# Patient Record
Sex: FEMALE | Race: WHITE | NOT HISPANIC OR LATINO | URBAN - METROPOLITAN AREA
[De-identification: names, ages, dates, MRNs, and addresses within clinical notes are randomized per-mention and may not be internally consistent; named-entity substitution may affect disease eponyms.]

---

## 2017-01-13 ENCOUNTER — EMERGENCY (EMERGENCY)
Facility: HOSPITAL | Age: 62
LOS: 1 days | Discharge: HOME | End: 2017-01-13
Attending: EMERGENCY MEDICINE | Admitting: EMERGENCY MEDICINE
Payer: COMMERCIAL

## 2017-01-13 VITALS
HEART RATE: 86 BPM | SYSTOLIC BLOOD PRESSURE: 126 MMHG | OXYGEN SATURATION: 99 % | DIASTOLIC BLOOD PRESSURE: 74 MMHG | RESPIRATION RATE: 18 BRPM | TEMPERATURE: 97 F

## 2017-01-13 DIAGNOSIS — Z88.0 ALLERGY STATUS TO PENICILLIN: ICD-10-CM

## 2017-01-13 DIAGNOSIS — Z79.899 OTHER LONG TERM (CURRENT) DRUG THERAPY: ICD-10-CM

## 2017-01-13 DIAGNOSIS — J20.9 ACUTE BRONCHITIS, UNSPECIFIED: ICD-10-CM

## 2017-01-13 DIAGNOSIS — R05 COUGH: ICD-10-CM

## 2017-01-13 PROCEDURE — 71020: CPT | Mod: 26

## 2017-01-13 PROCEDURE — 99284 EMERGENCY DEPT VISIT MOD MDM: CPT

## 2017-01-13 RX ORDER — ALBUTEROL 90 UG/1
2 AEROSOL, METERED ORAL
Qty: 1 | Refills: 0 | OUTPATIENT
Start: 2017-01-13 | End: 2017-01-17

## 2017-01-13 NOTE — ED PROVIDER NOTE - NS ED MD SCRIBE ATTENDING SCRIBE SECTIONS
REVIEW OF SYSTEMS/DISPOSITION/PAST MEDICAL/SURGICAL/SOCIAL HISTORY/HISTORY OF PRESENT ILLNESS/PHYSICAL EXAM/OBSERVATION MONITORING PLAN

## 2017-01-13 NOTE — ED PROVIDER NOTE - OBJECTIVE STATEMENT
60 yo F w/ hx of HTN c/o cough and cold sx of runny nose/congestion x1 week. Pt states she feels like she's getting sicker instead of better. Pt also describes pain to the back of her head, which she attributes to coughing so much. Denies CP, blood in phlegm, belly pain, n/v. Pt notes she had an colonoscopy on Monday. Has been taking Theraflu for cold sx, with little relief. Pt admits to getting flu vaccine this year.

## 2017-06-05 ENCOUNTER — EMERGENCY (EMERGENCY)
Facility: HOSPITAL | Age: 62
LOS: 1 days | Discharge: HOME | End: 2017-06-05
Attending: EMERGENCY MEDICINE | Admitting: EMERGENCY MEDICINE
Payer: COMMERCIAL

## 2017-06-05 VITALS
HEART RATE: 76 BPM | OXYGEN SATURATION: 98 % | RESPIRATION RATE: 16 BRPM | SYSTOLIC BLOOD PRESSURE: 152 MMHG | DIASTOLIC BLOOD PRESSURE: 84 MMHG | TEMPERATURE: 98 F

## 2017-06-05 VITALS — WEIGHT: 160.06 LBS | HEIGHT: 66 IN

## 2017-06-05 PROBLEM — I10 ESSENTIAL (PRIMARY) HYPERTENSION: Chronic | Status: ACTIVE | Noted: 2017-01-13

## 2017-06-05 PROCEDURE — 99284 EMERGENCY DEPT VISIT MOD MDM: CPT

## 2017-06-05 RX ORDER — DIAZEPAM 5 MG
5 TABLET ORAL ONCE
Qty: 0 | Refills: 0 | Status: DISCONTINUED | OUTPATIENT
Start: 2017-06-05 | End: 2017-06-05

## 2017-06-05 RX ORDER — KETOROLAC TROMETHAMINE 30 MG/ML
60 SYRINGE (ML) INJECTION ONCE
Qty: 0 | Refills: 0 | Status: DISCONTINUED | OUTPATIENT
Start: 2017-06-05 | End: 2017-06-05

## 2017-06-05 RX ORDER — IBUPROFEN 200 MG
1 TABLET ORAL
Qty: 30 | Refills: 0 | OUTPATIENT
Start: 2017-06-05 | End: 2017-06-12

## 2017-06-05 RX ORDER — DIAZEPAM 5 MG
1 TABLET ORAL
Qty: 9 | Refills: 0 | OUTPATIENT
Start: 2017-06-05 | End: 2017-06-08

## 2017-06-05 RX ADMIN — Medication 5 MILLIGRAM(S): at 13:18

## 2017-06-05 RX ADMIN — Medication 60 MILLIGRAM(S): at 13:18

## 2017-06-05 RX ADMIN — Medication 60 MILLIGRAM(S): at 13:38

## 2017-06-05 NOTE — ED PROVIDER NOTE - MEDICAL DECISION MAKING DETAILS
atraumatic pain with bending over, no acute surgical symptoms on exam. advised pain control and pmd/spine f/u for out pt imaging.

## 2017-06-05 NOTE — ED ADULT NURSE NOTE - CHPI ED SYMPTOMS NEG
no anorexia/no difficulty bearing weight/no tingling/no fatigue/no bowel dysfunction/no bladder dysfunction/no constipation/no neck tenderness/no numbness/no motor function loss

## 2017-06-05 NOTE — ED PROVIDER NOTE - CHPI ED SYMPTOMS NEG
no motor function loss/no anorexia/no bowel dysfunction/no tingling/no constipation/no fatigue/no bladder dysfunction/no neck tenderness/no numbness/no difficulty bearing weight

## 2017-06-05 NOTE — ED PROVIDER NOTE - NEUROLOGICAL, MLM
Alert and oriented, no focal deficits, no motor or sensory deficits. Neg SLR bilateral, stable gait, strength 5/5 to bilateral lower extremities

## 2017-06-05 NOTE — ED ADULT TRIAGE NOTE - CHIEF COMPLAINT QUOTE
Pt accidentally pulled her back out on Sunday while bending over to pick something up. OTC medications are not helping.

## 2017-06-05 NOTE — ED ADULT NURSE NOTE - OBJECTIVE STATEMENT
pt. aaox3, states she hurt her back saturday from bending over. pt. has slight relief with OTC medication (tylenol)

## 2017-06-09 DIAGNOSIS — I10 ESSENTIAL (PRIMARY) HYPERTENSION: ICD-10-CM

## 2017-06-09 DIAGNOSIS — Z79.899 OTHER LONG TERM (CURRENT) DRUG THERAPY: ICD-10-CM

## 2017-06-09 DIAGNOSIS — M54.5 LOW BACK PAIN: ICD-10-CM

## 2017-06-09 DIAGNOSIS — Z88.0 ALLERGY STATUS TO PENICILLIN: ICD-10-CM

## 2017-06-09 DIAGNOSIS — Z79.1 LONG TERM (CURRENT) USE OF NON-STEROIDAL ANTI-INFLAMMATORIES (NSAID): ICD-10-CM

## 2017-08-01 ENCOUNTER — EMERGENCY (EMERGENCY)
Facility: HOSPITAL | Age: 62
LOS: 1 days | Discharge: HOME | End: 2017-08-01
Attending: EMERGENCY MEDICINE | Admitting: EMERGENCY MEDICINE
Payer: COMMERCIAL

## 2017-08-01 VITALS
SYSTOLIC BLOOD PRESSURE: 144 MMHG | RESPIRATION RATE: 18 BRPM | DIASTOLIC BLOOD PRESSURE: 84 MMHG | HEART RATE: 70 BPM | OXYGEN SATURATION: 96 % | TEMPERATURE: 98 F

## 2017-08-01 DIAGNOSIS — I10 ESSENTIAL (PRIMARY) HYPERTENSION: ICD-10-CM

## 2017-08-01 DIAGNOSIS — Z79.899 OTHER LONG TERM (CURRENT) DRUG THERAPY: ICD-10-CM

## 2017-08-01 DIAGNOSIS — G89.29 OTHER CHRONIC PAIN: ICD-10-CM

## 2017-08-01 DIAGNOSIS — Z79.1 LONG TERM (CURRENT) USE OF NON-STEROIDAL ANTI-INFLAMMATORIES (NSAID): ICD-10-CM

## 2017-08-01 DIAGNOSIS — Z88.0 ALLERGY STATUS TO PENICILLIN: ICD-10-CM

## 2017-08-01 DIAGNOSIS — M54.5 LOW BACK PAIN: ICD-10-CM

## 2017-08-01 DIAGNOSIS — Z79.891 LONG TERM (CURRENT) USE OF OPIATE ANALGESIC: ICD-10-CM

## 2017-08-01 PROCEDURE — 72100 X-RAY EXAM L-S SPINE 2/3 VWS: CPT | Mod: 26

## 2017-08-01 PROCEDURE — 99284 EMERGENCY DEPT VISIT MOD MDM: CPT | Mod: 25

## 2017-08-01 RX ORDER — DIAZEPAM 5 MG
1 TABLET ORAL
Qty: 8 | Refills: 0 | OUTPATIENT
Start: 2017-08-01 | End: 2017-08-05

## 2017-08-01 RX ORDER — KETOROLAC TROMETHAMINE 30 MG/ML
30 SYRINGE (ML) INJECTION ONCE
Qty: 0 | Refills: 0 | Status: DISCONTINUED | OUTPATIENT
Start: 2017-08-01 | End: 2017-08-01

## 2017-08-01 RX ADMIN — Medication 30 MILLIGRAM(S): at 18:00

## 2017-08-01 NOTE — ED PROVIDER NOTE - MEDICAL DECISION MAKING DETAILS
Pt presents with chronic generalized low back pain x2 months. Will give Valium and Toradol. X-ray ordered. Re-evaluate. Instructed to follow up with orthopedist and PMD.

## 2017-08-01 NOTE — ED PROVIDER NOTE - OBJECTIVE STATEMENT
63 yo F with a hx of HTN, currently on Losartan presents with chronic generalized low back pain [id number]. No exertion or trauma recently. No change in bladder/bowel dysfunction. Notes that she was here last time for back pain and had relieve with Toradol. Denies any chance of pregnancy. Has orthopedist follow up for August 28th. PMD: Dr. Lynch

## 2018-01-10 ENCOUNTER — EMERGENCY (EMERGENCY)
Facility: HOSPITAL | Age: 63
LOS: 1 days | Discharge: HOME | End: 2018-01-10
Admitting: EMERGENCY MEDICINE
Payer: COMMERCIAL

## 2018-01-10 VITALS
SYSTOLIC BLOOD PRESSURE: 152 MMHG | OXYGEN SATURATION: 96 % | HEART RATE: 65 BPM | RESPIRATION RATE: 16 BRPM | DIASTOLIC BLOOD PRESSURE: 87 MMHG | TEMPERATURE: 97 F

## 2018-01-10 DIAGNOSIS — Z88.0 ALLERGY STATUS TO PENICILLIN: ICD-10-CM

## 2018-01-10 DIAGNOSIS — Z79.1 LONG TERM (CURRENT) USE OF NON-STEROIDAL ANTI-INFLAMMATORIES (NSAID): ICD-10-CM

## 2018-01-10 DIAGNOSIS — Z79.899 OTHER LONG TERM (CURRENT) DRUG THERAPY: ICD-10-CM

## 2018-01-10 DIAGNOSIS — M54.5 LOW BACK PAIN: ICD-10-CM

## 2018-01-10 DIAGNOSIS — I10 ESSENTIAL (PRIMARY) HYPERTENSION: ICD-10-CM

## 2018-01-10 PROCEDURE — 99284 EMERGENCY DEPT VISIT MOD MDM: CPT | Mod: 25

## 2018-01-10 PROCEDURE — 71046 X-RAY EXAM CHEST 2 VIEWS: CPT | Mod: 26

## 2018-01-10 PROCEDURE — 72100 X-RAY EXAM L-S SPINE 2/3 VWS: CPT | Mod: 26

## 2018-01-10 RX ORDER — LOSARTAN POTASSIUM 100 MG/1
1 TABLET, FILM COATED ORAL
Qty: 0 | Refills: 0 | COMMUNITY

## 2018-01-10 RX ORDER — DIAZEPAM 5 MG
5 TABLET ORAL ONCE
Qty: 0 | Refills: 0 | Status: DISCONTINUED | OUTPATIENT
Start: 2018-01-10 | End: 2018-01-10

## 2018-01-10 RX ORDER — DIAZEPAM 5 MG
1 TABLET ORAL
Qty: 9 | Refills: 0 | OUTPATIENT
Start: 2018-01-10 | End: 2018-01-12

## 2018-01-10 RX ORDER — KETOROLAC TROMETHAMINE 30 MG/ML
60 SYRINGE (ML) INJECTION ONCE
Qty: 0 | Refills: 0 | Status: DISCONTINUED | OUTPATIENT
Start: 2018-01-10 | End: 2018-01-10

## 2018-01-10 RX ADMIN — Medication 60 MILLIGRAM(S): at 11:14

## 2018-01-10 RX ADMIN — Medication 5 MILLIGRAM(S): at 11:14

## 2018-01-10 NOTE — ED PROVIDER NOTE - MEDICAL DECISION MAKING DETAILS
61 y/o F presents to ED c/o recurrent lumbar back pain.  Pt treated effectively with Toradol injection and Valium Rx as described.  No red flags, pt ambulatory, full weight bearing, independently.  Pt prescribed Naprosyn and Valium, advised to f/u with PCP for MRI.

## 2018-01-10 NOTE — ED PROVIDER NOTE - OBJECTIVE STATEMENT
61 yo F with a hx of HTN, sciatica, allergy to penicillin presents with chronic generalized low back pain that occurs approximately 3x/year. Pt states pain is usually triggered by bending over and resolves in 2-3 days. Yesterday she was getting up from her chair when she suddenly felt back pain. She took Tylenol yesterday, ibuprofen today, and has been applying menthol pads, with no relief. Pt notes she has had sciatica in her right leg, but has not felt it No numbness or tingling in BUE or BLE, no bladder/bowel dysfunction, no exertion or trauma recently. 63 yo F with a hx of HTN, sciatica, allergy to penicillin presents with chronic generalized low back pain that occurs approximately 3x/year. Pt states pain is usually triggered by bending over and resolves in 2-3 days. Yesterday she was getting up from her chair when she suddenly felt back pain. She took Tylenol yesterday, ibuprofen today, and has been applying menthol pads, with no relief. Pt notes she has had sciatica in her right leg, but has not felt it since it symptoms began. Also notes she usually gets a "shot" that helps with pain. Denies abdominal pain, numbness or tingling in BUE or BLE, bladder/bowel dysfunction. Denies recent exertion or trauma. Denies tobacco use. 63 yo F with a hx of HTN, sciatica, allergy to penicillin presents with chronic generalized low back pain that occurs approximately 3x/year. Pt states pain is usually triggered by bending over and resolves in 2-3 days. Yesterday she was getting up from her chair when she suddenly felt back pain. She took Tylenol yesterday, ibuprofen today, and has been applying menthol pads, with no relief. Pt notes she has had sciatica in her right leg, but has not felt it since it symptoms began. Also notes she usually gets a "shot" that helps with pain. Denies fevers, abdominal pain, IVDA, numbness or tingling in BUE or BLE, bladder/bowel dysfunction, weakness. Denies recent exertion or trauma. Denies tobacco use.

## 2018-01-10 NOTE — ED ADULT NURSE NOTE - ADDITIONAL PRINTED INSTRUCTIONS GIVEN
D/c w/ instructions for f/u, pt to f/u w/ pmd, return to ER for worsening symptoms. Verbalized understanding

## 2018-05-17 ENCOUNTER — EMERGENCY (EMERGENCY)
Facility: HOSPITAL | Age: 63
LOS: 1 days | Discharge: HOME | End: 2018-05-17
Attending: EMERGENCY MEDICINE | Admitting: EMERGENCY MEDICINE
Payer: COMMERCIAL

## 2018-05-17 VITALS
TEMPERATURE: 98 F | SYSTOLIC BLOOD PRESSURE: 122 MMHG | DIASTOLIC BLOOD PRESSURE: 77 MMHG | RESPIRATION RATE: 15 BRPM | OXYGEN SATURATION: 97 % | HEART RATE: 77 BPM

## 2018-05-17 VITALS
HEART RATE: 86 BPM | WEIGHT: 154.1 LBS | RESPIRATION RATE: 18 BRPM | OXYGEN SATURATION: 96 % | DIASTOLIC BLOOD PRESSURE: 68 MMHG | SYSTOLIC BLOOD PRESSURE: 109 MMHG | TEMPERATURE: 101 F

## 2018-05-17 DIAGNOSIS — Z88.0 ALLERGY STATUS TO PENICILLIN: ICD-10-CM

## 2018-05-17 DIAGNOSIS — E87.6 HYPOKALEMIA: ICD-10-CM

## 2018-05-17 DIAGNOSIS — N39.0 URINARY TRACT INFECTION, SITE NOT SPECIFIED: ICD-10-CM

## 2018-05-17 DIAGNOSIS — Z79.1 LONG TERM (CURRENT) USE OF NON-STEROIDAL ANTI-INFLAMMATORIES (NSAID): ICD-10-CM

## 2018-05-17 DIAGNOSIS — Z79.899 OTHER LONG TERM (CURRENT) DRUG THERAPY: ICD-10-CM

## 2018-05-17 DIAGNOSIS — I10 ESSENTIAL (PRIMARY) HYPERTENSION: ICD-10-CM

## 2018-05-17 DIAGNOSIS — Z79.51 LONG TERM (CURRENT) USE OF INHALED STEROIDS: ICD-10-CM

## 2018-05-17 DIAGNOSIS — R52 PAIN, UNSPECIFIED: ICD-10-CM

## 2018-05-17 LAB
ALBUMIN SERPL ELPH-MCNC: 3.7 G/DL — SIGNIFICANT CHANGE UP (ref 3.4–5)
ALP SERPL-CCNC: 57 U/L — SIGNIFICANT CHANGE UP (ref 40–120)
ALT FLD-CCNC: 31 U/L — SIGNIFICANT CHANGE UP (ref 12–42)
ANION GAP SERPL CALC-SCNC: 6 MMOL/L — LOW (ref 9–16)
APPEARANCE UR: CLEAR — SIGNIFICANT CHANGE UP
APTT BLD: 31 SEC — SIGNIFICANT CHANGE UP (ref 27.5–36.5)
AST SERPL-CCNC: 21 U/L — SIGNIFICANT CHANGE UP (ref 15–37)
BASOPHILS NFR BLD AUTO: 0.3 % — SIGNIFICANT CHANGE UP (ref 0–2)
BILIRUB SERPL-MCNC: 0.6 MG/DL — SIGNIFICANT CHANGE UP (ref 0.2–1.2)
BILIRUB UR-MCNC: NEGATIVE — SIGNIFICANT CHANGE UP
BUN SERPL-MCNC: 16 MG/DL — SIGNIFICANT CHANGE UP (ref 7–23)
CALCIUM SERPL-MCNC: 9.2 MG/DL — SIGNIFICANT CHANGE UP (ref 8.5–10.5)
CHLORIDE SERPL-SCNC: 94 MMOL/L — LOW (ref 96–108)
CO2 SERPL-SCNC: 31 MMOL/L — SIGNIFICANT CHANGE UP (ref 22–31)
COLOR SPEC: YELLOW — SIGNIFICANT CHANGE UP
CREAT SERPL-MCNC: 0.92 MG/DL — SIGNIFICANT CHANGE UP (ref 0.5–1.3)
DIFF PNL FLD: NEGATIVE — SIGNIFICANT CHANGE UP
EOSINOPHIL NFR BLD AUTO: 0.1 % — SIGNIFICANT CHANGE UP (ref 0–6)
FLUAV SPEC QL CULT: NEGATIVE — SIGNIFICANT CHANGE UP
FLUBV AG SPEC QL IA: NEGATIVE — SIGNIFICANT CHANGE UP
GLUCOSE SERPL-MCNC: 107 MG/DL — HIGH (ref 70–99)
GLUCOSE UR QL: NEGATIVE — SIGNIFICANT CHANGE UP
HCT VFR BLD CALC: 39.1 % — SIGNIFICANT CHANGE UP (ref 34.5–45)
HGB BLD-MCNC: 13.5 G/DL — SIGNIFICANT CHANGE UP (ref 11.5–15.5)
IMM GRANULOCYTES NFR BLD AUTO: 0.6 % — SIGNIFICANT CHANGE UP (ref 0–1.5)
INR BLD: 1.05 — SIGNIFICANT CHANGE UP (ref 0.88–1.16)
KETONES UR-MCNC: (no result) MG/DL
LACTATE SERPL-SCNC: 0.8 MMOL/L — SIGNIFICANT CHANGE UP (ref 0.4–2)
LEUKOCYTE ESTERASE UR-ACNC: (no result)
LIDOCAIN IGE QN: 118 U/L — SIGNIFICANT CHANGE UP (ref 73–393)
LYMPHOCYTES # BLD AUTO: 6 % — LOW (ref 13–44)
MAGNESIUM SERPL-MCNC: 1.7 MG/DL — SIGNIFICANT CHANGE UP (ref 1.6–2.6)
MAGNESIUM SERPL-MCNC: 1.7 MG/DL — SIGNIFICANT CHANGE UP (ref 1.6–2.6)
MCHC RBC-ENTMCNC: 28.8 PG — SIGNIFICANT CHANGE UP (ref 27–34)
MCHC RBC-ENTMCNC: 34.5 G/DL — SIGNIFICANT CHANGE UP (ref 32–36)
MCV RBC AUTO: 83.4 FL — SIGNIFICANT CHANGE UP (ref 80–100)
MONOCYTES NFR BLD AUTO: 6.2 % — SIGNIFICANT CHANGE UP (ref 2–14)
NEUTROPHILS NFR BLD AUTO: 86.8 % — HIGH (ref 43–77)
NITRITE UR-MCNC: NEGATIVE — SIGNIFICANT CHANGE UP
PH UR: 7 — SIGNIFICANT CHANGE UP (ref 5–8)
PLATELET # BLD AUTO: 260 K/UL — SIGNIFICANT CHANGE UP (ref 150–400)
POTASSIUM SERPL-MCNC: 2.7 MMOL/L — CRITICAL LOW (ref 3.5–5.3)
POTASSIUM SERPL-SCNC: 2.7 MMOL/L — CRITICAL LOW (ref 3.5–5.3)
PROT SERPL-MCNC: 7.3 G/DL — SIGNIFICANT CHANGE UP (ref 6.4–8.2)
PROT UR-MCNC: NEGATIVE MG/DL — SIGNIFICANT CHANGE UP
PROTHROM AB SERPL-ACNC: 11.6 SEC — SIGNIFICANT CHANGE UP (ref 9.8–12.7)
RBC # BLD: 4.69 M/UL — SIGNIFICANT CHANGE UP (ref 3.8–5.2)
RBC # FLD: 12.6 % — SIGNIFICANT CHANGE UP (ref 10.3–16.9)
SODIUM SERPL-SCNC: 131 MMOL/L — LOW (ref 132–145)
SP GR SPEC: 1.02 — SIGNIFICANT CHANGE UP (ref 1–1.03)
UROBILINOGEN FLD QL: 0.2 E.U./DL — SIGNIFICANT CHANGE UP
WBC # BLD: 10.4 K/UL — SIGNIFICANT CHANGE UP (ref 3.8–10.5)
WBC # FLD AUTO: 10.4 K/UL — SIGNIFICANT CHANGE UP (ref 3.8–10.5)

## 2018-05-17 PROCEDURE — 71046 X-RAY EXAM CHEST 2 VIEWS: CPT | Mod: 26

## 2018-05-17 PROCEDURE — 99220: CPT

## 2018-05-17 RX ORDER — KETOROLAC TROMETHAMINE 30 MG/ML
30 SYRINGE (ML) INJECTION ONCE
Qty: 0 | Refills: 0 | Status: DISCONTINUED | OUTPATIENT
Start: 2018-05-17 | End: 2018-05-17

## 2018-05-17 RX ORDER — SODIUM CHLORIDE 9 MG/ML
1000 INJECTION INTRAMUSCULAR; INTRAVENOUS; SUBCUTANEOUS ONCE
Qty: 0 | Refills: 0 | Status: COMPLETED | OUTPATIENT
Start: 2018-05-17 | End: 2018-05-17

## 2018-05-17 RX ORDER — AZITHROMYCIN 500 MG/1
500 TABLET, FILM COATED ORAL ONCE
Qty: 0 | Refills: 0 | Status: COMPLETED | OUTPATIENT
Start: 2018-05-17 | End: 2018-05-17

## 2018-05-17 RX ORDER — ONDANSETRON 8 MG/1
4 TABLET, FILM COATED ORAL ONCE
Qty: 0 | Refills: 0 | Status: COMPLETED | OUTPATIENT
Start: 2018-05-17 | End: 2018-05-17

## 2018-05-17 RX ORDER — SODIUM CHLORIDE 9 MG/ML
2000 INJECTION INTRAMUSCULAR; INTRAVENOUS; SUBCUTANEOUS ONCE
Qty: 0 | Refills: 0 | Status: COMPLETED | OUTPATIENT
Start: 2018-05-17 | End: 2018-05-17

## 2018-05-17 RX ORDER — SACCHAROMYCES BOULARDII 250 MG
1 POWDER IN PACKET (EA) ORAL
Qty: 60 | Refills: 0 | OUTPATIENT
Start: 2018-05-17 | End: 2018-06-15

## 2018-05-17 RX ORDER — ACETAMINOPHEN 500 MG
650 TABLET ORAL ONCE
Qty: 0 | Refills: 0 | Status: COMPLETED | OUTPATIENT
Start: 2018-05-17 | End: 2018-05-17

## 2018-05-17 RX ORDER — CEFUROXIME AXETIL 250 MG
1 TABLET ORAL
Qty: 10 | Refills: 0 | OUTPATIENT
Start: 2018-05-17 | End: 2018-05-21

## 2018-05-17 RX ORDER — POTASSIUM CHLORIDE 20 MEQ
40 PACKET (EA) ORAL EVERY 4 HOURS
Qty: 0 | Refills: 0 | Status: COMPLETED | OUTPATIENT
Start: 2018-05-17 | End: 2018-05-17

## 2018-05-17 RX ORDER — POTASSIUM CHLORIDE 20 MEQ
10 PACKET (EA) ORAL
Qty: 0 | Refills: 0 | Status: COMPLETED | OUTPATIENT
Start: 2018-05-17 | End: 2018-05-17

## 2018-05-17 RX ADMIN — SODIUM CHLORIDE 2000 MILLILITER(S): 9 INJECTION INTRAMUSCULAR; INTRAVENOUS; SUBCUTANEOUS at 18:40

## 2018-05-17 RX ADMIN — Medication 10 MILLIEQUIVALENT(S): at 18:55

## 2018-05-17 RX ADMIN — SODIUM CHLORIDE 2000 MILLILITER(S): 9 INJECTION INTRAMUSCULAR; INTRAVENOUS; SUBCUTANEOUS at 17:35

## 2018-05-17 RX ADMIN — Medication 40 MILLIEQUIVALENT(S): at 17:34

## 2018-05-17 RX ADMIN — Medication 650 MILLIGRAM(S): at 17:34

## 2018-05-17 RX ADMIN — Medication 75 MILLIGRAM(S): at 21:58

## 2018-05-17 RX ADMIN — Medication 75 MILLIGRAM(S): at 18:27

## 2018-05-17 RX ADMIN — Medication 100 MILLIEQUIVALENT(S): at 18:27

## 2018-05-17 RX ADMIN — Medication 10 MILLIEQUIVALENT(S): at 20:01

## 2018-05-17 RX ADMIN — Medication 100 MILLIEQUIVALENT(S): at 19:46

## 2018-05-17 RX ADMIN — SODIUM CHLORIDE 1000 MILLILITER(S): 9 INJECTION INTRAMUSCULAR; INTRAVENOUS; SUBCUTANEOUS at 16:13

## 2018-05-17 RX ADMIN — ONDANSETRON 4 MILLIGRAM(S): 8 TABLET, FILM COATED ORAL at 16:13

## 2018-05-17 RX ADMIN — Medication 10 MILLIEQUIVALENT(S): at 21:00

## 2018-05-17 RX ADMIN — AZITHROMYCIN 500 MILLIGRAM(S): 500 TABLET, FILM COATED ORAL at 18:27

## 2018-05-17 RX ADMIN — Medication 40 MILLIEQUIVALENT(S): at 21:16

## 2018-05-17 RX ADMIN — SODIUM CHLORIDE 1000 MILLILITER(S): 9 INJECTION INTRAMUSCULAR; INTRAVENOUS; SUBCUTANEOUS at 21:00

## 2018-05-17 RX ADMIN — Medication 100 MILLIEQUIVALENT(S): at 17:35

## 2018-05-17 NOTE — ED CDU PROVIDER DISPOSITION NOTE - CLINICAL COURSE
Patient placed on obs for flu like sx, labs showed significant hypoK, treated with IV/PO repletion and IVF. Covered with PO abx and Tamiflu. Did well and was stable for d/c. Will add Ceftin for poss UTI (no sx). Will not continue Azithro as CXR clear.

## 2018-05-17 NOTE — ED PROVIDER NOTE - CONSTITUTIONAL, MLM
normal... Well appearing, well nourished, awake, alert, oriented to person, place, time/situation and in no apparent distress. Ill appearing but non toxic, well nourished, awake, alert, oriented to person, place, time/situation and in no apparent distress.

## 2018-05-17 NOTE — ED ADULT TRIAGE NOTE - CHIEF COMPLAINT QUOTE
body aches, nausea, vomiting, fatigue since yesterday, progressively getting worse. denies CP, SOB, diarrhea, constipation.

## 2018-05-17 NOTE — ED PROVIDER NOTE - OBJECTIVE STATEMENT
64 y/o F w/ hx of HTN and Sciatica presents to ED w/ c/o body aches, nausea, vomiting, fatigue since yesterday. Pt went to her PMD today for her annual physical and expressed to her physician that she was not feeling well, that she was tired and achy and by 2pm today the body aches worsen. Pt states she could not pick her head up, had N/V. Has a little HA now but denies abd pain, chest pain, sob, nasal congestion. Pt received flu shot this year. 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 Pt also reports having a TIA w/ low K when she was younger. States that low K is normal for her.

## 2018-05-17 NOTE — ED CDU PROVIDER INITIAL DAY NOTE - MEDICAL DECISION MAKING DETAILS
Patient presenting with rapid onset flu-like sx. Fever east initiated, covered with abx and Tamiflu.

## 2018-05-17 NOTE — ED ADULT NURSE REASSESSMENT NOTE - NS ED NURSE REASSESS COMMENT FT1
Received pt from EFREN Goodwin, Pt reports improvement in body aches, A&Ox3, denies any pain at this time

## 2018-05-17 NOTE — ED PROVIDER NOTE - MEDICAL DECISION MAKING DETAILS
Pt presenting w/ flu like sx w/ associated N/V. Will send labs and cultures and check chest XR. Empiric abx and Theraflu.

## 2018-05-17 NOTE — ED CDU PROVIDER INITIAL DAY NOTE - PROGRESS NOTE DETAILS
Patient feeling much better, got K= 80MeQ Po and 30 IV. UA looks +ve but no sx. Will tx with Ceftin. PCN allergy was hives as a child. patient understands risks. CXR clear. Will also cover with Tamiflu.

## 2018-05-18 LAB
CULTURE RESULTS: SIGNIFICANT CHANGE UP
SPECIMEN SOURCE: SIGNIFICANT CHANGE UP

## 2018-05-21 NOTE — ED POST DISCHARGE NOTE - DETAILS
Spoke to the patient after her identity was confirmed and provided her with her test results as they were negative. The patient was thankful for the call back.

## 2018-05-23 LAB
CULTURE RESULTS: SIGNIFICANT CHANGE UP
CULTURE RESULTS: SIGNIFICANT CHANGE UP
SPECIMEN SOURCE: SIGNIFICANT CHANGE UP
SPECIMEN SOURCE: SIGNIFICANT CHANGE UP

## 2019-08-06 ENCOUNTER — EMERGENCY (EMERGENCY)
Facility: HOSPITAL | Age: 64
LOS: 1 days | Discharge: HOME | End: 2019-08-06
Admitting: EMERGENCY MEDICINE
Payer: COMMERCIAL

## 2019-08-06 VITALS
RESPIRATION RATE: 16 BRPM | TEMPERATURE: 99 F | OXYGEN SATURATION: 97 % | SYSTOLIC BLOOD PRESSURE: 122 MMHG | HEART RATE: 81 BPM | DIASTOLIC BLOOD PRESSURE: 71 MMHG

## 2019-08-06 VITALS
HEIGHT: 66 IN | OXYGEN SATURATION: 96 % | WEIGHT: 154.98 LBS | SYSTOLIC BLOOD PRESSURE: 154 MMHG | RESPIRATION RATE: 18 BRPM | TEMPERATURE: 102 F | HEART RATE: 95 BPM | DIASTOLIC BLOOD PRESSURE: 89 MMHG

## 2019-08-06 PROBLEM — G45.9 TRANSIENT CEREBRAL ISCHEMIC ATTACK, UNSPECIFIED: Chronic | Status: ACTIVE | Noted: 2018-05-17

## 2019-08-06 PROBLEM — M54.30 SCIATICA, UNSPECIFIED SIDE: Chronic | Status: ACTIVE | Noted: 2017-06-05

## 2019-08-06 LAB
APPEARANCE UR: CLEAR — SIGNIFICANT CHANGE UP
APTT BLD: 27 SEC — LOW (ref 27.5–36.3)
BASOPHILS NFR BLD AUTO: 0.3 % — SIGNIFICANT CHANGE UP (ref 0–2)
BILIRUB UR-MCNC: NEGATIVE — SIGNIFICANT CHANGE UP
COLOR SPEC: YELLOW — SIGNIFICANT CHANGE UP
DIFF PNL FLD: ABNORMAL
EOSINOPHIL NFR BLD AUTO: 0 % — SIGNIFICANT CHANGE UP (ref 0–6)
GLUCOSE UR QL: NEGATIVE — SIGNIFICANT CHANGE UP
HCT VFR BLD CALC: 41.8 % — SIGNIFICANT CHANGE UP (ref 34.5–45)
HGB BLD-MCNC: 14.8 G/DL — SIGNIFICANT CHANGE UP (ref 11.5–15.5)
IMM GRANULOCYTES NFR BLD AUTO: 0.4 % — SIGNIFICANT CHANGE UP (ref 0–1.5)
INR BLD: 1.03 — SIGNIFICANT CHANGE UP (ref 0.88–1.16)
KETONES UR-MCNC: 15 MG/DL
LACTATE SERPL-SCNC: 1.7 MMOL/L — SIGNIFICANT CHANGE UP (ref 0.4–2)
LEUKOCYTE ESTERASE UR-ACNC: NEGATIVE — SIGNIFICANT CHANGE UP
LYMPHOCYTES # BLD AUTO: 6.8 % — LOW (ref 13–44)
MCHC RBC-ENTMCNC: 28.8 PG — SIGNIFICANT CHANGE UP (ref 27–34)
MCHC RBC-ENTMCNC: 35.4 G/DL — SIGNIFICANT CHANGE UP (ref 32–36)
MCV RBC AUTO: 81.3 FL — SIGNIFICANT CHANGE UP (ref 80–100)
MONOCYTES NFR BLD AUTO: 5.4 % — SIGNIFICANT CHANGE UP (ref 2–14)
NEUTROPHILS NFR BLD AUTO: 87.1 % — HIGH (ref 43–77)
NITRITE UR-MCNC: NEGATIVE — SIGNIFICANT CHANGE UP
PH UR: 6.5 — SIGNIFICANT CHANGE UP (ref 5–8)
PLATELET # BLD AUTO: 213 K/UL — SIGNIFICANT CHANGE UP (ref 150–400)
PROT UR-MCNC: NEGATIVE MG/DL — SIGNIFICANT CHANGE UP
PROTHROM AB SERPL-ACNC: 11.4 SEC — SIGNIFICANT CHANGE UP (ref 10–12.9)
RBC # BLD: 5.14 M/UL — SIGNIFICANT CHANGE UP (ref 3.8–5.2)
RBC # FLD: 12.8 % — SIGNIFICANT CHANGE UP (ref 10.3–14.5)
SP GR SPEC: 1.02 — SIGNIFICANT CHANGE UP (ref 1–1.03)
UROBILINOGEN FLD QL: 0.2 E.U./DL — SIGNIFICANT CHANGE UP
WBC # BLD: 6.9 K/UL — SIGNIFICANT CHANGE UP (ref 3.8–10.5)
WBC # FLD AUTO: 6.9 K/UL — SIGNIFICANT CHANGE UP (ref 3.8–10.5)

## 2019-08-06 PROCEDURE — 99284 EMERGENCY DEPT VISIT MOD MDM: CPT

## 2019-08-06 PROCEDURE — 93010 ELECTROCARDIOGRAM REPORT: CPT

## 2019-08-06 PROCEDURE — 71045 X-RAY EXAM CHEST 1 VIEW: CPT | Mod: 26

## 2019-08-06 RX ORDER — CEFTRIAXONE 500 MG/1
1000 INJECTION, POWDER, FOR SOLUTION INTRAMUSCULAR; INTRAVENOUS ONCE
Refills: 0 | Status: COMPLETED | OUTPATIENT
Start: 2019-08-06 | End: 2019-08-06

## 2019-08-06 RX ORDER — ONDANSETRON 8 MG/1
1 TABLET, FILM COATED ORAL
Qty: 10 | Refills: 0
Start: 2019-08-06

## 2019-08-06 RX ORDER — ACETAMINOPHEN 500 MG
650 TABLET ORAL ONCE
Refills: 0 | Status: COMPLETED | OUTPATIENT
Start: 2019-08-06 | End: 2019-08-06

## 2019-08-06 RX ORDER — SODIUM CHLORIDE 9 MG/ML
2200 INJECTION INTRAMUSCULAR; INTRAVENOUS; SUBCUTANEOUS ONCE
Refills: 0 | Status: COMPLETED | OUTPATIENT
Start: 2019-08-06 | End: 2019-08-06

## 2019-08-06 RX ORDER — ONDANSETRON 8 MG/1
4 TABLET, FILM COATED ORAL ONCE
Refills: 0 | Status: COMPLETED | OUTPATIENT
Start: 2019-08-06 | End: 2019-08-06

## 2019-08-06 RX ORDER — METOCLOPRAMIDE HCL 10 MG
10 TABLET ORAL ONCE
Refills: 0 | Status: COMPLETED | OUTPATIENT
Start: 2019-08-06 | End: 2019-08-06

## 2019-08-06 RX ADMIN — ONDANSETRON 4 MILLIGRAM(S): 8 TABLET, FILM COATED ORAL at 11:35

## 2019-08-06 RX ADMIN — SODIUM CHLORIDE 2200 MILLILITER(S): 9 INJECTION INTRAMUSCULAR; INTRAVENOUS; SUBCUTANEOUS at 11:55

## 2019-08-06 RX ADMIN — Medication 10 MILLIGRAM(S): at 11:58

## 2019-08-06 RX ADMIN — CEFTRIAXONE 100 MILLIGRAM(S): 500 INJECTION, POWDER, FOR SOLUTION INTRAMUSCULAR; INTRAVENOUS at 11:54

## 2019-08-06 RX ADMIN — Medication 650 MILLIGRAM(S): at 11:54

## 2019-08-06 RX ADMIN — ONDANSETRON 4 MILLIGRAM(S): 8 TABLET, FILM COATED ORAL at 11:58

## 2019-08-06 NOTE — ED PROVIDER NOTE - OBJECTIVE STATEMENT
63 y/o female with PMHx of HTN, sciatica, allergy to penicillin, presents to the ED with complaints of body aches, nausea, vomiting, and cough. Patient initially developed body aches 2 days ago, which worsened last night and developed cough and vomiting last night. Patient reports vomiting white phlegm. +Sick contact with patients while in the [place]. Denies any abdominal surgeries. Denies fever, chills, abdominal pain, diarrhea, rhinorrhea, congestion, headache, dizziness, chest pain, SOB, palpitations. 63 y/o female with PMHx of HTN, sciatica, allergy to penicillin, presents to the ED with complaints of body aches, nausea, vomiting, and cough. Patient initially developed body aches 2 days ago, which worsened last night and developed cough and vomiting last night. Patient reports vomiting white phlegm. +Sick contact with patients while in the [place]. Denies any abdominal surgeries. Denies fever, chills, abdominal pain, diarrhea, rhinorrhea, throat pain, congestion, headache, dizziness, chest pain, SOB, palpitations. 63 y/o female with PMHx of HTN, sciatica, allergy to penicillin, presents to the ED with complaints of body aches and cough x 3 days. c/o nausea with vomiting today, NBNB. no abdominal pain. no HA. no flank pain. Patient reports vomiting white phlegm.   Denies fever/chills at home. no abdominal pain, diarrhea, rhinorrhea, throat pain, congestion, headache, dizziness, chest pain, SOB or palpitations.  No recent travel.

## 2019-08-06 NOTE — ED PROVIDER NOTE - NSFOLLOWUPINSTRUCTIONS_ED_ALL_ED_FT
Drink plenty of fluids. Stay hydrated.   Take Zofran as needed for nausea as prescribed  Weld diet - advance diet slowly. Bread, rice, apple sauce, toast. Avoid dairy for the first week.     Follow up with your doctor within 2-3 days for re-evaluation    RETURN TO THE EMERGENCY DEPARTMENT FOR PERSISTENT FEVER, VOMITING, ABDOMINAL PAIN, CHEST PAIN, TROUBLE BREATHING OR ANY CONCERNING OR WORSENING SYMPTOMS

## 2019-08-06 NOTE — ED PROVIDER NOTE - PHYSICAL EXAMINATION
VITAL SIGNS: I have reviewed nursing notes and confirm.  CONSTITUTIONAL: Well-developed; well-nourished; in no acute distress.  SKIN: Skin is warm and dry, no acute rash.  HEAD: Normocephalic; atraumatic.  EYES: PERRL, EOM intact; conjunctiva and sclera clear.  ENT: No nasal discharge; airway clear.  NECK: Supple; non tender.  CARD: S1, S2 normal; no murmurs, gallops, or rubs. Regular rate and rhythm.  RESP: No wheezes, rales or rhonchi.  ABD: Normal bowel sounds; soft; non-distended; non-tender; no hepatosplenomegaly.  EXT: Normal ROM. No clubbing, cyanosis or edema.  NEURO: Alert, oriented. Grossly unremarkable.  PSYCH: Cooperative, appropriate. VITAL SIGNS: I have reviewed nursing notes and confirm.  CONSTITUTIONAL: Well-developed; well-nourished; in no acute distress.  SKIN: Skin is warm and dry, no acute rash.  HEAD: Normocephalic; atraumatic.  EYES: PERRL, EOM intact; conjunctiva and sclera clear.  ENT: No nasal discharge; airway clear. pharynx clear. normal ear exam bilaterally.   NECK: Supple; non tender.  CARD: S1, S2 normal; no murmurs, gallops, or rubs. Regular rate and rhythm.  RESP: No wheezes, rales or rhonchi.  ABD: Normal bowel sounds; soft; non-distended; non-tender. no guarding or rebound. no cvat bilaterally.   EXT: Normal ROM. No clubbing, cyanosis or edema.  NEURO: Alert, oriented. Grossly unremarkable.  PSYCH: Cooperative, appropriate.

## 2019-08-06 NOTE — ED PROVIDER NOTE - PROGRESS NOTE DETAILS
patient feeling better -  she is asking to be discharged. tolerating PO. vitals improved. abdomen soft nontender nondistended. cxr clear. labs reviewed, UA appears contaminated, no urinary symptoms, pending urine culture. no signs of meningitis. appears to be viral syndrome, patient wishing to leave now with spouse as she is feeling better, tolerating PO. advised hydration, supportive care. zofran prn. BRAT diet, advance diet slowly, f/u PMD, return precautions discussed, patient agrees with plan, d/c home with

## 2019-08-06 NOTE — ED ADULT TRIAGE NOTE - CHIEF COMPLAINT QUOTE
BIBA for body aches, nausea, vomiting and cough. symptoms gradually started on Sunday. denies abdominal pain, diarrhea. +emesis bag given in triage due to pt actively vomiting.

## 2019-08-06 NOTE — ED PROVIDER NOTE - CLINICAL SUMMARY MEDICAL DECISION MAKING FREE TEXT BOX
cough, nausea, vomiting, febrile upon arrival, rest of vitals normal, will order sepsis labs, IVF, cxr, UA, pan culture, antipyretics prn, IV antibiotics reassess.

## 2019-08-06 NOTE — ED PROVIDER NOTE - CHPI ED SYMPTOMS NEG
no fever/no chills/no abdominal pain, no rhinorrhea, no congestion, no headache, no dizziness, no chest pain, no SOB, no palpitations/no diarrhea no chills/no abdominal pain, no rhinorrhea, no throat pain, no congestion, no headache, no dizziness, no chest pain, no SOB, no palpitations/no diarrhea/no fever

## 2019-08-07 ENCOUNTER — EMERGENCY (EMERGENCY)
Facility: HOSPITAL | Age: 64
LOS: 1 days | Discharge: HOME | End: 2019-08-07
Admitting: EMERGENCY MEDICINE
Payer: COMMERCIAL

## 2019-08-07 VITALS
DIASTOLIC BLOOD PRESSURE: 72 MMHG | SYSTOLIC BLOOD PRESSURE: 120 MMHG | TEMPERATURE: 98 F | HEART RATE: 70 BPM | RESPIRATION RATE: 18 BRPM | OXYGEN SATURATION: 98 %

## 2019-08-07 VITALS
DIASTOLIC BLOOD PRESSURE: 69 MMHG | OXYGEN SATURATION: 96 % | SYSTOLIC BLOOD PRESSURE: 125 MMHG | HEART RATE: 90 BPM | TEMPERATURE: 99 F | RESPIRATION RATE: 15 BRPM

## 2019-08-07 LAB
CULTURE RESULTS: NO GROWTH — SIGNIFICANT CHANGE UP
SPECIMEN SOURCE: SIGNIFICANT CHANGE UP

## 2019-08-07 PROCEDURE — 99284 EMERGENCY DEPT VISIT MOD MDM: CPT

## 2019-08-07 RX ORDER — ONDANSETRON 8 MG/1
4 TABLET, FILM COATED ORAL ONCE
Refills: 0 | Status: COMPLETED | OUTPATIENT
Start: 2019-08-07 | End: 2019-08-07

## 2019-08-07 RX ORDER — SODIUM CHLORIDE 9 MG/ML
1000 INJECTION INTRAMUSCULAR; INTRAVENOUS; SUBCUTANEOUS ONCE
Refills: 0 | Status: COMPLETED | OUTPATIENT
Start: 2019-08-07 | End: 2019-08-07

## 2019-08-07 RX ORDER — ACETAMINOPHEN 500 MG
650 TABLET ORAL ONCE
Refills: 0 | Status: COMPLETED | OUTPATIENT
Start: 2019-08-07 | End: 2019-08-07

## 2019-08-07 RX ADMIN — ONDANSETRON 4 MILLIGRAM(S): 8 TABLET, FILM COATED ORAL at 21:34

## 2019-08-07 RX ADMIN — Medication 650 MILLIGRAM(S): at 20:36

## 2019-08-07 RX ADMIN — ONDANSETRON 4 MILLIGRAM(S): 8 TABLET, FILM COATED ORAL at 19:48

## 2019-08-07 RX ADMIN — SODIUM CHLORIDE 1000 MILLILITER(S): 9 INJECTION INTRAMUSCULAR; INTRAVENOUS; SUBCUTANEOUS at 21:35

## 2019-08-07 RX ADMIN — Medication 650 MILLIGRAM(S): at 19:47

## 2019-08-07 RX ADMIN — SODIUM CHLORIDE 1000 MILLILITER(S): 9 INJECTION INTRAMUSCULAR; INTRAVENOUS; SUBCUTANEOUS at 19:47

## 2019-08-07 NOTE — ED PROVIDER NOTE - CLINICAL SUMMARY MEDICAL DECISION MAKING FREE TEXT BOX
63 y/o female returns to the ER with generalized weakness, nausea, and a cough. Symptoms improved since her visit yesterday. Ordered Zofran, Tylenol, and IV hydration. 65 y/o female returns to the ER with generalized weakness, nausea, and a cough however, symptoms improved since her visit yesterday. Returns mostly for hydration. Ordered Zofran, Tylenol, IV hydration, and reassess.

## 2019-08-07 NOTE — ED ADULT TRIAGE NOTE - CHIEF COMPLAINT QUOTE
patient here with nausea and decreased PO intake since last night; seen in ER yesterday and dx with stomach virus; still not feeling well today; fever, chills at home; no fever in triage

## 2019-08-07 NOTE — ED PROVIDER NOTE - NS ED ROS FT
Denies vomiting, diarrhea, constipation, abdominal pain, urinary symptoms, chest pain, palpitations, shortness of breath, dyspnea on exertion, syncope/near syncope, headache,, numbness, focal deficits, visual changes, gait or balance changes, dizziness    + Nausea, generalized weakness, cough

## 2019-08-07 NOTE — ED PROVIDER NOTE - OBJECTIVE STATEMENT
65 y/o female with PMHx of HTN, Sciatica and TIA.     Pt returns to ER with generalized weakness and nausea that began 3 days ago. Pt arrived to the ER yesterday via ambulance and was diagnosed with a stomach virus. She is no longer vomiting but is still experiencing fever, chills, sweats, and loss of appetite and was able to arrive via cab today. Cough has improved since yesterday, now presents with non-productive cough. Last BM was 2 days ago. She has taken unspecified anti-nausea medication as well as 1000mg Tylenol BID. Last Tylenol dose taken at approximately 3pm. The pt works in a hospital.

## 2019-08-07 NOTE — ED PROVIDER NOTE - PROGRESS NOTE DETAILS
Pt states she is feeling much better after 2L NS IV and 8mg zofran IV. Tolerated PO. abd still soft/non-tender. Ambulating around ED with steady gait. Requesting to go home. Discussed strict return precautions. Pt will f/u with PCP.

## 2019-08-07 NOTE — ED PROVIDER NOTE - NSFOLLOWUPINSTRUCTIONS_ED_ALL_ED_FT
-PLEASE FOLLOW-UP WITH YOUR PRIMARY CARE DOCTOR IN 1-2 DAYS.  BRING ALL PAPERWORK FROM TODAY'S VISIT TO YOUR FOLLOW-UP VISIT.  IF YOU DO NOT HAVE A PRIMARY CARE DOCTOR PLEASE REFER TO THE OFFICE/CLINIC INFORMATION GIVEN ABOVE.  YOU MAY ALSO CALL [phone] AND ASK FOR MS. SADIQ ESCALANTE.  SHE CAN HELP YOU MAKE A FOLLOW-UP APPOINTMENT.  HER HOURS ARE 11AM-7PM MONDAY - FRIDAY.  -PLEASE RETURN TO THE ER IMMEDIATELY OR CALL 911 FOR ANY HIGH FEVER, TROUBLE BREATHING, VOMITING, SEVERE PAIN, OR ANY OTHER CONCERNS.

## 2019-08-07 NOTE — ED PROVIDER NOTE - PHYSICAL EXAMINATION
VITAL SIGNS: I have reviewed nursing notes and confirm.  CONSTITUTIONAL: Well-developed; well-nourished; in no acute distress. Non-toxic appearing.  SKIN: Skin is warm and dry, no acute rash.  HEAD: Normocephalic; atraumatic.  EYES: PERRL, EOM intact; conjunctiva and sclera clear.  ENT: No nasal discharge; airway clear.  NECK: Supple; non tender.  CARD: S1, S2 normal; no murmurs, gallops, or rubs. Regular rate and rhythm.  RESP: No wheezes, rales or rhonchi.  ABD: Normal bowel sounds; soft; non-distended; non-tender; no hepatosplenomegaly.  EXT: Normal ROM. No clubbing, cyanosis or edema.  NEURO: Alert, oriented. Grossly unremarkable.  PSYCH: Cooperative, appropriate.

## 2019-08-09 DIAGNOSIS — R05 COUGH: ICD-10-CM

## 2019-08-09 DIAGNOSIS — R11.2 NAUSEA WITH VOMITING, UNSPECIFIED: ICD-10-CM

## 2019-08-09 DIAGNOSIS — R50.9 FEVER, UNSPECIFIED: ICD-10-CM

## 2019-08-11 DIAGNOSIS — R11.10 VOMITING, UNSPECIFIED: ICD-10-CM

## 2019-08-11 DIAGNOSIS — R53.1 WEAKNESS: ICD-10-CM

## 2019-08-11 DIAGNOSIS — Z79.899 OTHER LONG TERM (CURRENT) DRUG THERAPY: ICD-10-CM

## 2019-08-11 DIAGNOSIS — R50.9 FEVER, UNSPECIFIED: ICD-10-CM

## 2019-08-11 DIAGNOSIS — I10 ESSENTIAL (PRIMARY) HYPERTENSION: ICD-10-CM

## 2019-08-11 DIAGNOSIS — R05 COUGH: ICD-10-CM

## 2019-10-25 PROBLEM — Z00.00 ENCOUNTER FOR PREVENTIVE HEALTH EXAMINATION: Status: ACTIVE | Noted: 2019-10-25

## 2019-11-03 ENCOUNTER — FORM ENCOUNTER (OUTPATIENT)
Age: 64
End: 2019-11-03

## 2019-11-04 ENCOUNTER — APPOINTMENT (OUTPATIENT)
Dept: ORTHOPEDIC SURGERY | Facility: CLINIC | Age: 64
End: 2019-11-04
Payer: COMMERCIAL

## 2019-11-04 ENCOUNTER — APPOINTMENT (OUTPATIENT)
Dept: RADIOLOGY | Facility: CLINIC | Age: 64
End: 2019-11-04

## 2019-11-04 ENCOUNTER — OUTPATIENT (OUTPATIENT)
Dept: OUTPATIENT SERVICES | Facility: HOSPITAL | Age: 64
LOS: 1 days | End: 2019-11-04
Payer: COMMERCIAL

## 2019-11-04 ENCOUNTER — TRANSCRIPTION ENCOUNTER (OUTPATIENT)
Age: 64
End: 2019-11-04

## 2019-11-04 VITALS — HEIGHT: 66 IN | WEIGHT: 160 LBS | BODY MASS INDEX: 25.71 KG/M2

## 2019-11-04 DIAGNOSIS — G45.9 TRANSIENT CEREBRAL ISCHEMIC ATTACK, UNSPECIFIED: ICD-10-CM

## 2019-11-04 DIAGNOSIS — Z82.49 FAMILY HISTORY OF ISCHEMIC HEART DISEASE AND OTHER DISEASES OF THE CIRCULATORY SYSTEM: ICD-10-CM

## 2019-11-04 DIAGNOSIS — S76.012A STRAIN OF MUSCLE, FASCIA AND TENDON OF LEFT HIP, INITIAL ENCOUNTER: ICD-10-CM

## 2019-11-04 DIAGNOSIS — Z82.5 FAMILY HISTORY OF ASTHMA AND OTHER CHRONIC LOWER RESPIRATORY DISEASES: ICD-10-CM

## 2019-11-04 DIAGNOSIS — Z78.9 OTHER SPECIFIED HEALTH STATUS: ICD-10-CM

## 2019-11-04 DIAGNOSIS — Z86.79 PERSONAL HISTORY OF OTHER DISEASES OF THE CIRCULATORY SYSTEM: ICD-10-CM

## 2019-11-04 DIAGNOSIS — M62.830 MUSCLE SPASM OF BACK: ICD-10-CM

## 2019-11-04 PROCEDURE — 99204 OFFICE O/P NEW MOD 45 MIN: CPT

## 2019-11-04 PROCEDURE — 72170 X-RAY EXAM OF PELVIS: CPT | Mod: 26

## 2019-11-04 RX ORDER — LOSARTAN POTASSIUM 50 MG/1
50 TABLET, FILM COATED ORAL
Refills: 0 | Status: ACTIVE | COMMUNITY

## 2019-11-04 RX ORDER — CHLORTHALIDONE 25 MG/1
25 TABLET ORAL
Refills: 0 | Status: ACTIVE | COMMUNITY

## 2019-11-04 RX ORDER — AMLODIPINE BESYLATE 2.5 MG/1
2.5 TABLET ORAL
Refills: 0 | Status: ACTIVE | COMMUNITY

## 2019-11-04 RX ORDER — MELOXICAM 15 MG/1
15 TABLET ORAL
Qty: 30 | Refills: 0 | Status: ACTIVE | COMMUNITY
Start: 2019-11-04 | End: 1900-01-01

## 2020-03-09 NOTE — ED PROVIDER NOTE - MUSCULOSKELETAL MINIMAL EXAM
atraumatic/Pt ambulatory.  Difficulty with straight leg raise, R>L.  NVI.  5/5 strength to bilat legs. no

## 2021-06-29 NOTE — ED ADULT TRIAGE NOTE - TEMPERATURE IN CELSIUS (DEGREES C)
Patient is resting in bed and is alert and oriented x3. Patient has no complaints of pain at this time. Will continue to monitor and assess. Family meeting with hospice today at 11:30. Patient is on 1.5L of O2 on nasal cannula. Head to toe assessment done and all morning meds are given. All needs are met and safety precautions are in place. Will monitor and assess.   Electronically signed by Whitney Soni RN on 6/29/2021 at 11:32 AM 38.3

## 2021-11-24 NOTE — ED PROVIDER NOTE - MEDICAL DECISION MAKING DETAILS
Problem List  Date Reviewed: 10/27/2021          Codes Class Noted    Supervision of normal first pregnancy, antepartum ICD-10-CM: Z34.00  ICD-9-CM: V22.0  10/8/2021    Overview Addendum 11/18/2021  7:24 AM by Mayelin Crane     Intrauterine pregnancy with the following problems identified:  [facility] 5/11/2021 - IVF  NIPTS-NIPTS low fetal fraction - repeat at next visit  [facility] - neg  Flu vaccine 10/6/21  Declines covid vaccine  AB screen pos for autoantibodies - need 5 pink tops for AB screen at 39 weeks and draw on admission to L&D  - NEEDS 5 PINK TOPS FOR AB SCREEN bronchitis, neg CXR, albuterol, hycodan and tessalon PRN and follow with PMD

## 2022-02-01 NOTE — ED ADULT TRIAGE NOTE - SPO2 (%)
96 Gabapentin Counseling: I discussed with the patient the risks of gabapentin including but not limited to dizziness, somnolence, fatigue and ataxia.

## 2022-10-13 NOTE — ED PROVIDER NOTE - RADIATION
Before your surgery:  10 days before: stop all over the counter supplements  1 week before: stop aspirin if you are taking aspirin.   stop ibuprofen, naproxen or similar antiinflammatory medications. You may use Tylenol for pain or headache.     On the day of your surgery:  Do not take any medication the morning of your surgery.     After surgery:    You may resume all your medications after the surgery unless your surgeon instructs you otherwise    Will get xray of shoulder and consider physical therapy   Preparing for Your Surgery  Getting started  A nurse will call you to review your health history and instructions. They will give you an arrival time based on your scheduled surgery time. Please be ready to share:  Your doctor's clinic name and phone number  Your medical, surgical and anesthesia history  A list of allergies and sensitivities  A list of medicines, including herbal treatments and over-the-counter drugs  Whether the patient has a legal guardian (ask how to send us the papers in advance)  Please tell us if you're pregnant--or if there's any chance you might be pregnant. Some surgeries may injure a fetus (unborn baby), so they require a pregnancy test. Surgeries that are safe for a fetus don't always need a test, and you can choose whether to have one.   If you have a child who's having surgery, please ask for a copy of Preparing for Your Child's Surgery.    Preparing for surgery  Within 10 to 30 days of surgery: Have a pre-op exam (sometimes called an H&P, or History and Physical). This can be done at a clinic or pre-operative center.  If you're having a , you may not need this exam. Talk to your care team.  At your pre-op exam, talk to your care team about all medicines you take. If you need to stop any medicines before surgery, ask when to start taking them again.  We do this for your safety. Many medicines can make you bleed too much during surgery. Some change how well surgery  (anesthesia) drugs work.  Call your insurance company to let them know you're having surgery. (If you don't have insurance, call [phone].)  Call your clinic if there's any change in your health. This includes signs of a cold or flu (sore throat, runny nose, cough, rash, fever). It also includes a scrape or scratch near the surgery site.  If you have questions on the day of surgery, call your hospital or surgery center.  COVID testing  You may need to be tested for COVID-19 before having surgery. If so, we will give you instructions (or click here).  Eating and drinking guidelines  For your safety: Unless your surgeon tells you otherwise, follow the guidelines below.  Eat and drink as usual until 8 hours before surgery. After that, no food or milk.  Drink clear liquids until 2 hours before surgery. These are liquids you can see through, like water, Gatorade and Propel Water. You may also have black coffee and tea (no cream or milk).  Nothing by mouth within 2 hours of surgery. This includes gum, candy and breath mints.  If you drink alcohol: Stop drinking it the night before surgery.  If your care team tells you to take medicine on the morning of surgery, it's okay to take it with a sip of water.  Preventing infection  Shower or bathe the night before and morning of your surgery. Follow the instructions your clinic gave you. (If no instructions, use regular soap.)  Don't shave or clip hair near your surgery site. We'll remove the hair if needed.  Don't smoke or vape the morning of surgery. You may chew nicotine gum up to 2 hours before surgery. A nicotine patch is okay.  Note: Some surgeries require you to completely quit smoking and nicotine. Check with your surgeon.  Your care team will make every effort to keep you safe from infection. We will:  Clean our hands often with soap and water (or an alcohol-based hand rub).  Clean the skin at your surgery site with a special soap that kills germs.  Give you a  special gown to keep you warm. (Cold raises the risk of infection.)  Wear special hair covers, masks, gowns and gloves during surgery.  Give antibiotic medicine, if prescribed. Not all surgeries need antibiotics.  What to bring on the day of surgery  Photo ID and insurance card  Copy of your health care directive, if you have one  Glasses and hearing aides (bring cases)  You can't wear contacts during surgery  Inhaler and eye drops, if you use them (tell us about these when you arrive)  CPAP machine or breathing device, if you use them  A few personal items, if spending the night  If you have . . .  A pacemaker, ICD (cardiac defibrillator) or other implant: Bring the ID card.  An implanted stimulator: Bring the remote control.  A legal guardian: Bring a copy of the certified (court-stamped) guardianship papers.  Please remove any jewelry, including body piercings. Leave jewelry and other valuables at home.  If you're going home the day of surgery  You must have a responsible adult drive you home. They should stay with you overnight as well.  If you don't have someone to stay with you, and you aren't safe to go home alone, we may keep you overnight. Insurance often won't pay for this.  After surgery  If it's hard to control your pain or you need more pain medicine, please call your surgeon's office.  Questions?   If you have any questions for your care team, list them here: _________________________________________________________________________________________________________________________________________________________________________ ____________________________________ ____________________________________ ____________________________________  For informational purposes only. Not to replace the advice of your health care provider. Copyright   2003, 2019 [facility]. All rights reserved. Clinically reviewed by Diandra Horowitz MD. [employer] [id number] - REV 07/22.     no radiation

## 2022-11-22 NOTE — ED ADULT TRIAGE NOTE - ESI TRIAGE ACUITY LEVEL, MLM
----- Message from Dale Ojeda MD sent at 11/18/2022 12:00 PM CST -----  Regarding: General procedure follow-up interval  Please arrange consult with Dr. Garza for gastroesophageal reflux and a large hiatal hernia.  Also obtain abdominal ultrasound with portal vein Doppler study for suspected early esophageal varices    
----- Message from Dale Ojeda MD sent at 11/22/2022 11:19 AM CST -----  Please notify patient normal esophageal biopsies.  No change in plan of care.  
Dr. Ojeda, for the abdominal ultrasound with portal vein doppler study, do you want this looking at the main portal vein only or with all the portal veins?   
Main portal vein is fine  
Noted. Called and spoke with pt conveying Dr. Ojeda's recommendations. Pt states she has already seen Dr. Garza and they are planning on surgery in 2023 so she states no referral is necessary. Pt agreeable to u/s, order placed and pt connected to central scheduling.     Awaiting pathology results, pt aware she will receive a call back with those results.   
Spoke with patient   Aware of results  Denies concerns.     
4

## 2023-03-20 NOTE — ED ADULT NURSE NOTE - NS ED NURSE DISCH DISPOSITION
[Initial Visit] : an initial visit for [Shoulder Pain] : shoulder pain [FreeTextEntry2] : right shoulder pain Discharged

## 2024-08-10 NOTE — ED ADULT NURSE NOTE - NS ED PATIENT SAFETY CONCERN
Received call from patient's wife.  Patient tested positive for COVID-19 yesterday.  Symptoms for 2-3 days.  Consider his age and comorbidities he would benefit from Paxlovid therapy.  Normal GFR earlier this year.  Paxlovid sent to pharmacy.  There is an interaction with his Eliquis but in light of his age/comorbidities would want to prevent complications from severe COVID.    No

## 2024-11-27 ENCOUNTER — EMERGENCY (EMERGENCY)
Facility: HOSPITAL | Age: 69
LOS: 1 days | Discharge: HOME | End: 2024-11-27
Admitting: EMERGENCY MEDICINE
Payer: MEDICARE

## 2024-11-27 VITALS
RESPIRATION RATE: 18 BRPM | HEART RATE: 76 BPM | DIASTOLIC BLOOD PRESSURE: 73 MMHG | SYSTOLIC BLOOD PRESSURE: 130 MMHG | OXYGEN SATURATION: 97 % | TEMPERATURE: 98 F

## 2024-11-27 PROCEDURE — 73620 X-RAY EXAM OF FOOT: CPT | Mod: 26,LT

## 2024-11-27 PROCEDURE — 99284 EMERGENCY DEPT VISIT MOD MDM: CPT

## 2024-11-27 RX ORDER — NAPROXEN 250 MG/1
500 TABLET ORAL ONCE
Refills: 0 | Status: COMPLETED | OUTPATIENT
Start: 2024-11-27 | End: 2024-11-27

## 2024-11-27 RX ORDER — ACETAMINOPHEN 500 MG
650 TABLET ORAL ONCE
Refills: 0 | Status: COMPLETED | OUTPATIENT
Start: 2024-11-27 | End: 2024-11-27

## 2024-11-27 RX ADMIN — NAPROXEN 500 MILLIGRAM(S): 250 TABLET ORAL at 11:08

## 2024-11-27 RX ADMIN — Medication 650 MILLIGRAM(S): at 11:08

## 2024-11-27 NOTE — ED PROVIDER NOTE - NS ED ROS FT
Patient denies fever, gout history, eating lots of meat/alcohol, wearing high heels,  trauma, chest pain, shortness of breath, abdominal pain, nausea, vomiting, alcohol/smoking/drug use.

## 2024-11-27 NOTE — ED ADULT NURSE NOTE - NSFALLUNIVINTERV_ED_ALL_ED
Bed/Stretcher in lowest position, wheels locked, appropriate side rails in place/Call bell, personal items and telephone in reach/Instruct patient to call for assistance before getting out of bed/chair/stretcher/Non-slip footwear applied when patient is off stretcher/[place] to call system/Physically safe environment - no spills, clutter or unnecessary equipment/Purposeful proactive rounding/Room/bathroom lighting operational, light cord in reach

## 2024-11-27 NOTE — ED ADULT NURSE NOTE - NS ED PATIENT SAFETY CONCERN
0730 At this time morning assessment complete. Eli was encountered in his bedroom lying in bed. Patient was cooperative with assessment. Eye contact is noted to be fair. Patient appears flat. Mood is noted to be anxious. Patient endorsed anxiety and depression, denied suicidal thoughts, HI and A/V hallucinations. C-SSRS level is low risk. Patient is med compliant. There are no untoward side effects from medications to note. Q 15 minutes ongoing for safety. Will continue to monitor.      Problem: Anxiety  Goal: Will report anxiety at manageable levels  Description: INTERVENTIONS:  1. Administer medication as ordered  2. Teach and rehearse alternative coping skills  3. Provide emotional support with 1:1 interaction with staff  Outcome: Progressing  Flowsheets (Taken 11/11/2024 0905)  Will report anxiety at manageable levels: Administer medication as ordered      No

## 2024-11-27 NOTE — ED PROVIDER NOTE - PHYSICAL EXAMINATION
VITAL SIGNS: I have reviewed nursing notes and confirm.  CONSTITUTIONAL: Well-developed; well-nourished; in no acute distress.  MSK: Erythema and slight swelling to the left MTP joint with slight TTP. Full ROM of b/l feet and patient able to ambulate, Pedal pulse intact.  PSYCH: Cooperative, appropriate. Mood and affect wnl.

## 2024-11-27 NOTE — ED PROVIDER NOTE - PATIENT PORTAL LINK FT
You can access the FollowMyHealth Patient Portal offered by [facility] by registering at the following website: [email or web address]. By joining [facility]’s FollowMyHealth portal, you will also be able to view your health information using other applications (apps) compatible with our system.

## 2024-11-27 NOTE — ED ADULT NURSE REASSESSMENT NOTE - NS ED NURSE REASSESS COMMENT FT1
Provider gave dc education  Pt not in chair for ppwk, provider confirmed  Pt not here for rpt vs  care complete

## 2024-11-27 NOTE — ED ADULT NURSE NOTE - OBJECTIVE STATEMENT
Pt presents to ed ambulatory, Pt AOX4, speaking in full clear sentences, breathing equal and unlabored  Per pt she has swelling and pain to left greater toe x 2 days, worse today  RN observed redness/swelling to left greater toe  Pt reports pain upon ambulation  Hx HTN (controlled)  Pt medicated as ordered. All medication education given to patient and pt understands. Plan of care ongoing

## 2024-11-27 NOTE — ED PROVIDER NOTE - OBJECTIVE STATEMENT
69-year-old female with PMH of hypertension, sciatica, and TIA presents to the ED complaining of atraumatic pain and swelling to the left MTP joint at the base of the big toe since yesterday.  Patient says that she started having some erythema, swelling, and pain to the left big toe region yesterday but today it got a lot worse.   Patient took Motrin and applied ice to the area, but it did not help.  Patient says she that this has never happened before

## 2024-11-27 NOTE — ED PROVIDER NOTE - CLINICAL SUMMARY MEDICAL DECISION MAKING FREE TEXT BOX
69-year-old female with PMH of hypertension, sciatica, and TIA presents to the ED complaining of atraumatic pain and swelling to the left MTP joint at the base of the big toe since yesterday. PE significant for erythema and slight swelling to the left MTP joint with slight TTP.  Left foot x-ray unremarkable for any acute changes. In the ED, patient got Tylenol and  naproxen with noted improvement.  Patient likely has  a bunion versus new onset gout and is stable for discharge.  Acute fracture and osteomyelitis ruled out.  Patient told to follow-up with her podiatrist and continue taking Motrin and Tylenol.  Return precautions explained and patient agreed.

## 2024-11-29 DIAGNOSIS — M79.89 OTHER SPECIFIED SOFT TISSUE DISORDERS: ICD-10-CM

## 2024-11-29 DIAGNOSIS — Z86.73 PERSONAL HISTORY OF TRANSIENT ISCHEMIC ATTACK (TIA), AND CEREBRAL INFARCTION WITHOUT RESIDUAL DEFICITS: ICD-10-CM

## 2024-11-29 DIAGNOSIS — I10 ESSENTIAL (PRIMARY) HYPERTENSION: ICD-10-CM

## 2024-11-29 DIAGNOSIS — Z88.0 ALLERGY STATUS TO PENICILLIN: ICD-10-CM

## 2024-11-29 DIAGNOSIS — M79.675 PAIN IN LEFT TOE(S): ICD-10-CM

## 2024-11-29 DIAGNOSIS — L53.9 ERYTHEMATOUS CONDITION, UNSPECIFIED: ICD-10-CM

## 2024-11-29 DIAGNOSIS — M54.30 SCIATICA, UNSPECIFIED SIDE: ICD-10-CM

## 2025-04-04 NOTE — ED PROVIDER NOTE - RATE
These can increase your chances of quitting for good. Quitting is one of the most important things you can do to protect your heart. It is never too late to quit. Try to avoid secondhand smoke too.     Stay at a weight that's healthy for you. Talk to your doctor if you need help losing weight.     Try to get 7 to 9 hours of sleep each night.     Limit alcohol to 2 drinks a day for men and 1 drink a day for women. Too much alcohol can cause health problems.     Manage other health problems such as diabetes, high blood pressure, and high cholesterol. If you think you may have a problem with alcohol or drug use, talk to your doctor.   Medicines    Take your medicines exactly as prescribed. Call your doctor if you think you are having a problem with your medicine.     If your doctor recommends aspirin, take the amount directed each day. Make sure you take aspirin and not another kind of pain reliever, such as acetaminophen (Tylenol).   When should you call for help?   Call 911 if you have symptoms of a heart attack. These may include:    Chest pain or pressure, or a strange feeling in the chest.     Sweating.     Shortness of breath.     Pain, pressure, or a strange feeling in the back, neck, jaw, or upper belly or in one or both shoulders or arms.     Lightheadedness or sudden weakness.     A fast or irregular heartbeat.   After you call 911, the  may tell you to chew 1 adult-strength or 2 to 4 low-dose aspirin. Wait for an ambulance. Do not try to drive yourself.  Watch closely for changes in your health, and be sure to contact your doctor if you have any problems.  Where can you learn more?  Go to https://www.[employer].net/patientEd and enter F075 to learn more about \"A Healthy Heart: Care Instructions.\"  Current as of: July 31, 2024  Content Version: 14.4  © 9440-5201 [employer].   Care instructions adapted under license by Jaco Solarsi. If you have questions about a medical condition or this  78

## 2025-04-12 ENCOUNTER — APPOINTMENT (OUTPATIENT)
Dept: MAMMOGRAPHY | Facility: CLINIC | Age: 70
End: 2025-04-12
Payer: MEDICARE

## 2025-04-12 ENCOUNTER — APPOINTMENT (OUTPATIENT)
Dept: RADIOLOGY | Facility: CLINIC | Age: 70
End: 2025-04-12
Payer: MEDICARE

## 2025-04-12 ENCOUNTER — OUTPATIENT (OUTPATIENT)
Dept: OUTPATIENT SERVICES | Facility: HOSPITAL | Age: 70
LOS: 1 days | End: 2025-04-12

## 2025-04-12 PROCEDURE — 77063 BREAST TOMOSYNTHESIS BI: CPT | Mod: 26

## 2025-04-12 PROCEDURE — 77080 DXA BONE DENSITY AXIAL: CPT | Mod: 26

## 2025-04-12 PROCEDURE — 77067 SCR MAMMO BI INCL CAD: CPT | Mod: 26

## 2025-04-18 NOTE — ED PROVIDER NOTE - NOSE [+], MLM
VS: VSS, pt denied CP or SOB.   O2: Room air sat. > 90%   Output: Not voiding on dialysis.    Last BM: 04/16/25   Activity: Resting comfortable in bed.   Skin: Wound on BLE and very dry and flaky skin.    Pain: Medicated with PRN medication.    Neuro: CMS and neuro intact to baseline.   Dressing: None.    Diet: Regular tolerating okay.   LDA:  PIV on RLE and R chest CVC double lumen for dialysis access.    Equipment: IV pole, walker and personal belongings.    Plan: TBD.    Additional Info:  surgery cancel today due to special blood not available, not sure if pt is going to surgery tomorrow morning. Pt was at dialysis since 1 pm and back to 5 ortho about 6:40 pm.  Lab draw was ordered this morning but never draw since the order was placed as unit collect, writer changed to lab collect and called lab and let them know pt need lab draw, but lab was not done all day writer called lab again this evening when pt. Was back on the unit, still waiting to be collected, will report to on coming RN to follow with lab.   bed alarm on for safety.         runny nose